# Patient Record
(demographics unavailable — no encounter records)

---

## 2025-03-27 NOTE — DATA REVIEWED
[de-identified] : Hearing Test performed to evaluate the extent of hearing loss and  to explain pt's symptoms  was personally reviewed and revealed Tymps-wnl Hearing-bilat SNHL L>R

## 2025-03-27 NOTE — ASSESSMENT
[FreeTextEntry1] : bilateral sensorineural hearing loss-cleared for hearing aids Pt to get MRI to me for review  f/u annual and prn

## 2025-03-27 NOTE — HISTORY OF PRESENT ILLNESS
[de-identified] : 63 yo accompanied by daughter-Urdu translater chr bilat tinnitus and HL w/right aural pressure 2 months ago had MRI and audio no other modifying factors no other nasal or throat complaints

## 2025-03-27 NOTE — DATA REVIEWED
[de-identified] : Hearing Test performed to evaluate the extent of hearing loss and  to explain pt's symptoms  was personally reviewed and revealed Tymps-wnl Hearing-bilat SNHL L>R
